# Patient Record
Sex: FEMALE | Race: WHITE | NOT HISPANIC OR LATINO | Employment: FULL TIME | URBAN - NONMETROPOLITAN AREA
[De-identification: names, ages, dates, MRNs, and addresses within clinical notes are randomized per-mention and may not be internally consistent; named-entity substitution may affect disease eponyms.]

---

## 2023-03-07 ENCOUNTER — TELEPHONE (OUTPATIENT)
Dept: PRIMARY CARE | Facility: CLINIC | Age: 73
End: 2023-03-07
Payer: MEDICARE

## 2023-03-07 DIAGNOSIS — K12.0 ULCER APHTHOUS ORAL: Primary | ICD-10-CM

## 2023-03-07 RX ORDER — VALACYCLOVIR HYDROCHLORIDE 1 G/1
1000 TABLET, FILM COATED ORAL 3 TIMES DAILY
Qty: 15 TABLET | Refills: 0 | Status: SHIPPED | OUTPATIENT
Start: 2023-03-07 | End: 2023-03-12

## 2023-03-07 NOTE — TELEPHONE ENCOUNTER
Spoke to pt  -   She did see ENT -   No changes - note reviewed     Now  pain on the other side  -   When tilts head to opposite side - does not hurt     The last one on the other side took a while to get better     Will try Antiviral for now  - she is in agreement

## 2023-03-13 ENCOUNTER — TELEPHONE (OUTPATIENT)
Dept: PRIMARY CARE | Facility: CLINIC | Age: 73
End: 2023-03-13

## 2023-03-13 NOTE — TELEPHONE ENCOUNTER
"I think I sent you a message on my chart but couldn't find a \"send\" button so I don't know if I did or not.      "

## 2023-03-23 PROBLEM — M50.90 CERVICAL DISC DISEASE: Status: ACTIVE | Noted: 2023-03-23

## 2023-03-23 PROBLEM — J31.0 CHRONIC RHINITIS: Status: ACTIVE | Noted: 2023-03-23

## 2023-03-23 PROBLEM — N90.5 VULVAR ATROPHY: Status: ACTIVE | Noted: 2023-03-23

## 2023-03-23 PROBLEM — B37.31 CANDIDAL VULVOVAGINITIS: Status: ACTIVE | Noted: 2023-03-23

## 2023-03-23 PROBLEM — R07.81 RIB PAIN: Status: ACTIVE | Noted: 2023-03-23

## 2023-03-23 PROBLEM — R20.2 PARESTHESIAS: Status: ACTIVE | Noted: 2023-03-23

## 2023-03-23 PROBLEM — E21.0 PRIMARY HYPERPARATHYROIDISM (MULTI): Status: ACTIVE | Noted: 2023-03-23

## 2023-03-23 PROBLEM — J03.90 TONSILLITIS: Status: ACTIVE | Noted: 2023-03-23

## 2023-03-23 PROBLEM — E55.9 VITAMIN D DEFICIENCY: Status: ACTIVE | Noted: 2023-03-23

## 2023-03-23 PROBLEM — N76.0 BACTERIAL VAGINITIS: Status: ACTIVE | Noted: 2023-03-23

## 2023-03-23 PROBLEM — B96.89 BACTERIAL VAGINITIS: Status: ACTIVE | Noted: 2023-03-23

## 2023-03-23 PROBLEM — M51.36 LUMBAR DEGENERATIVE DISC DISEASE: Status: ACTIVE | Noted: 2023-03-23

## 2023-03-23 PROBLEM — M81.0 OSTEOPOROSIS: Status: ACTIVE | Noted: 2023-03-23

## 2023-03-23 PROBLEM — U07.1 COVID-19: Status: ACTIVE | Noted: 2023-03-23

## 2023-03-23 PROBLEM — R30.0 DYSURIA: Status: ACTIVE | Noted: 2023-03-23

## 2023-03-23 PROBLEM — Z87.09 HISTORY OF TONSILLITIS: Status: ACTIVE | Noted: 2023-03-23

## 2023-03-23 PROBLEM — M54.2 NECK PAIN: Status: ACTIVE | Noted: 2023-03-23

## 2023-03-23 PROBLEM — E83.52 HYPERCALCEMIA: Status: ACTIVE | Noted: 2023-03-23

## 2023-03-23 PROBLEM — M53.3 SACROILIAC JOINT PAIN: Status: ACTIVE | Noted: 2023-03-23

## 2023-03-23 PROBLEM — M16.11 PRIMARY OSTEOARTHRITIS OF RIGHT HIP: Status: ACTIVE | Noted: 2023-03-23

## 2023-03-23 PROBLEM — M51.369 LUMBAR DEGENERATIVE DISC DISEASE: Status: ACTIVE | Noted: 2023-03-23

## 2023-03-23 PROBLEM — M79.605 PAIN OF LEFT LEG: Status: ACTIVE | Noted: 2023-03-23

## 2023-03-23 PROBLEM — Z85.3 HISTORY OF ADENOCARCINOMA OF BREAST: Status: ACTIVE | Noted: 2023-03-23

## 2023-03-23 PROBLEM — J02.9 PHARYNGITIS: Status: ACTIVE | Noted: 2023-03-23

## 2023-03-23 PROBLEM — K14.0 GLOSSITIS: Status: ACTIVE | Noted: 2023-03-23

## 2023-03-23 PROBLEM — E03.9 PRIMARY HYPOTHYROIDISM: Status: ACTIVE | Noted: 2023-03-23

## 2023-03-23 PROBLEM — R19.00 PELVIC MASS IN FEMALE: Status: ACTIVE | Noted: 2023-03-23

## 2023-03-23 PROBLEM — M89.9 DISORDER OF BONE: Status: ACTIVE | Noted: 2023-03-23

## 2023-03-23 RX ORDER — LANOLIN ALCOHOL/MO/W.PET/CERES
1 CREAM (GRAM) TOPICAL DAILY
COMMUNITY
Start: 2022-09-14

## 2023-03-23 RX ORDER — ACETAMINOPHEN 500 MG
1 TABLET ORAL DAILY
COMMUNITY

## 2023-03-23 RX ORDER — LEVOTHYROXINE SODIUM 75 UG/1
1 TABLET ORAL DAILY
COMMUNITY
Start: 2013-07-10 | End: 2023-05-15

## 2023-03-28 ENCOUNTER — OFFICE VISIT (OUTPATIENT)
Dept: PRIMARY CARE | Facility: CLINIC | Age: 73
End: 2023-03-28
Payer: MEDICARE

## 2023-03-28 VITALS
BODY MASS INDEX: 26.96 KG/M2 | WEIGHT: 169.6 LBS | HEART RATE: 67 BPM | DIASTOLIC BLOOD PRESSURE: 82 MMHG | SYSTOLIC BLOOD PRESSURE: 144 MMHG

## 2023-03-28 DIAGNOSIS — S39.012A STRAIN OF LUMBAR REGION, INITIAL ENCOUNTER: ICD-10-CM

## 2023-03-28 DIAGNOSIS — K21.9 GASTROESOPHAGEAL REFLUX DISEASE WITHOUT ESOPHAGITIS: ICD-10-CM

## 2023-03-28 DIAGNOSIS — K21.9 GASTROESOPHAGEAL REFLUX DISEASE WITHOUT ESOPHAGITIS: Primary | ICD-10-CM

## 2023-03-28 PROCEDURE — 1160F RVW MEDS BY RX/DR IN RCRD: CPT | Performed by: FAMILY MEDICINE

## 2023-03-28 PROCEDURE — 1036F TOBACCO NON-USER: CPT | Performed by: FAMILY MEDICINE

## 2023-03-28 PROCEDURE — 1159F MED LIST DOCD IN RCRD: CPT | Performed by: FAMILY MEDICINE

## 2023-03-28 PROCEDURE — 99213 OFFICE O/P EST LOW 20 MIN: CPT | Performed by: FAMILY MEDICINE

## 2023-03-28 RX ORDER — OMEPRAZOLE 20 MG/1
CAPSULE, DELAYED RELEASE ORAL
Qty: 30 CAPSULE | Refills: 2 | Status: SHIPPED | OUTPATIENT
Start: 2023-03-28 | End: 2023-03-28 | Stop reason: SDUPTHER

## 2023-03-28 RX ORDER — TIZANIDINE 2 MG/1
2 TABLET ORAL EVERY 8 HOURS PRN
Qty: 60 TABLET | Refills: 2 | Status: SHIPPED | OUTPATIENT
Start: 2023-03-28 | End: 2023-04-27

## 2023-03-28 RX ORDER — OMEPRAZOLE 20 MG/1
TABLET, DELAYED RELEASE ORAL
Qty: 30 TABLET | Refills: 2 | Status: SHIPPED | OUTPATIENT
Start: 2023-03-28 | End: 2023-03-28

## 2023-03-28 RX ORDER — OMEPRAZOLE 20 MG/1
20 CAPSULE, DELAYED RELEASE ORAL DAILY
Qty: 30 CAPSULE | Refills: 2 | Status: SHIPPED | OUTPATIENT
Start: 2023-03-28 | End: 2023-04-19

## 2023-03-28 ASSESSMENT — ENCOUNTER SYMPTOMS
OCCASIONAL FEELINGS OF UNSTEADINESS: 0
DEPRESSION: 0
LOSS OF SENSATION IN FEET: 0

## 2023-03-28 NOTE — PATIENT INSTRUCTIONS
I think what is happening is the night time Ibuprofen is causing some reflux -   Silent aspiration.    Stop the Ibuprofen - can take Tylenol as needed -  a back massage would help   Ibuprofen can raise blood pressure.      Lets have you take Omeprazole 20 mg -  30 min before dinner -   Try not to eat  or drink 3 hours before bed.      You should be able to stop this medication in 1 - 2 months if it helps you.     Can try tizanidine as needed for the muscle pain  -  may causes sedation .     Let me know if this helps

## 2023-03-28 NOTE — PROGRESS NOTES
Subjective   Patient ID: Susana Roa is a 72 y.o. female who presents for Sore Throat (2 months).    HPI     Throat issues for a while now  -   Did see ENT  - did not have specific concerns     Currently soreness back tongue and front of tongue -   Burning of tongue -     Throat feels scratchy     [facility] -  [facility]     Also pain left axilla - started a few weeks ago  -   But slowly improving - now only pain with pushing     Having low back pain often - taking Ibuprofen each PM     Works bent over a lot     Review of Systems    Objective   /82   Pulse 67   Wt 76.9 kg (169 lb 9.6 oz)   BMI 26.96 kg/m²     Physical Exam  Constitutional:       Appearance: Normal appearance.   HENT:      Head: Normocephalic and atraumatic.      Right Ear: Tympanic membrane and ear canal normal.      Left Ear: Tympanic membrane and ear canal normal.      Nose: No congestion or rhinorrhea.      Mouth/Throat:      Mouth: Mucous membranes are dry.      Pharynx: Oropharynx is clear.   Cardiovascular:      Rate and Rhythm: Normal rate.   Musculoskeletal:      Cervical back: Normal range of motion and neck supple. No tenderness.      Comments: Muscle spasm of low back  - priti on the right    Neurological:      Mental Status: She is alert.         Assessment/Plan   Problem List Items Addressed This Visit          Digestive    Gastroesophageal reflux disease without esophagitis - Primary     Discussed with her that the throat and tongues issues are likely from silent GERD -   And the ibuprofen hs is likey causing it  - she will stop that - we will try omeprazole a couple of months          Relevant Medications    omeprazole (PriLOSEC) 20 mg DR capsule       Musculoskeletal    Strain of lumbar region    Relevant Medications    tiZANidine (Zanaflex) 2 mg tablet

## 2023-03-29 PROBLEM — Z87.09 HISTORY OF TONSILLITIS: Status: RESOLVED | Noted: 2023-03-23 | Resolved: 2023-03-29

## 2023-03-29 PROBLEM — N76.0 BACTERIAL VAGINITIS: Status: RESOLVED | Noted: 2023-03-23 | Resolved: 2023-03-29

## 2023-03-29 PROBLEM — S39.012A STRAIN OF LUMBAR REGION: Status: ACTIVE | Noted: 2023-03-29

## 2023-03-29 PROBLEM — K21.9 GASTROESOPHAGEAL REFLUX DISEASE WITHOUT ESOPHAGITIS: Status: ACTIVE | Noted: 2023-03-29

## 2023-03-29 PROBLEM — B96.89 BACTERIAL VAGINITIS: Status: RESOLVED | Noted: 2023-03-23 | Resolved: 2023-03-29

## 2023-03-29 PROBLEM — B37.31 CANDIDAL VULVOVAGINITIS: Status: RESOLVED | Noted: 2023-03-23 | Resolved: 2023-03-29

## 2023-03-29 NOTE — ASSESSMENT & PLAN NOTE
Discussed with her that the throat and tongues issues are likely from silent GERD -   And the ibuprofen hs is likey causing it  - she will stop that - we will try omeprazole a couple of months

## 2023-04-19 DIAGNOSIS — K21.9 GASTROESOPHAGEAL REFLUX DISEASE WITHOUT ESOPHAGITIS: ICD-10-CM

## 2023-04-19 RX ORDER — OMEPRAZOLE 20 MG/1
CAPSULE, DELAYED RELEASE ORAL
Qty: 30 CAPSULE | Refills: 2 | Status: SHIPPED | OUTPATIENT
Start: 2023-04-19 | End: 2023-07-03

## 2023-05-12 ENCOUNTER — TELEPHONE (OUTPATIENT)
Dept: PRIMARY CARE | Facility: CLINIC | Age: 73
End: 2023-05-12
Payer: MEDICARE

## 2023-05-12 NOTE — TELEPHONE ENCOUNTER
I called pt  -   We talked - still an terrible irritation in the back of her throat and her tongue - has been on PPI 2 mos now - not much better    Told her I would like her to call the ENT again and tell them she needs a scope  - she will do that and let me know if she runs into an issue

## 2023-05-12 NOTE — TELEPHONE ENCOUNTER
I guess MY CHART is not sending you messages so I figured that I would make a quick call.  Things are the same.  I added a pepcid in the morning and it has not made any difference.  My throat still hurts and my tongue is just weird.  I feel okay otherwise, no stomach pain or anything.  I am willing to go get a scope done IF we are at that point yet.  Let me know.  Have a nice Mother's day.

## 2023-05-14 DIAGNOSIS — E03.9 HYPOTHYROIDISM, UNSPECIFIED: ICD-10-CM

## 2023-05-15 ENCOUNTER — PATIENT MESSAGE (OUTPATIENT)
Dept: PRIMARY CARE | Facility: CLINIC | Age: 73
End: 2023-05-15
Payer: MEDICARE

## 2023-05-15 DIAGNOSIS — Z12.11 COLON CANCER SCREENING: Primary | ICD-10-CM

## 2023-05-15 RX ORDER — LEVOTHYROXINE SODIUM 75 UG/1
TABLET ORAL
Qty: 90 TABLET | Refills: 0 | Status: SHIPPED | OUTPATIENT
Start: 2023-05-15 | End: 2023-08-14

## 2023-06-03 DIAGNOSIS — J02.9 PHARYNGITIS, UNSPECIFIED ETIOLOGY: Primary | ICD-10-CM

## 2023-07-03 ENCOUNTER — OFFICE VISIT (OUTPATIENT)
Dept: PRIMARY CARE | Facility: CLINIC | Age: 73
End: 2023-07-03
Payer: MEDICARE

## 2023-07-03 VITALS
DIASTOLIC BLOOD PRESSURE: 64 MMHG | SYSTOLIC BLOOD PRESSURE: 138 MMHG | OXYGEN SATURATION: 95 % | BODY MASS INDEX: 26.2 KG/M2 | HEART RATE: 86 BPM | HEIGHT: 66 IN | WEIGHT: 163 LBS

## 2023-07-03 DIAGNOSIS — M81.8 OTHER OSTEOPOROSIS, UNSPECIFIED PATHOLOGICAL FRACTURE PRESENCE: ICD-10-CM

## 2023-07-03 DIAGNOSIS — Z00.00 ROUTINE GENERAL MEDICAL EXAMINATION AT HEALTH CARE FACILITY: Primary | ICD-10-CM

## 2023-07-03 DIAGNOSIS — E21.0 PRIMARY HYPERPARATHYROIDISM (MULTI): ICD-10-CM

## 2023-07-03 DIAGNOSIS — K21.9 GASTROESOPHAGEAL REFLUX DISEASE WITHOUT ESOPHAGITIS: ICD-10-CM

## 2023-07-03 DIAGNOSIS — Z23 IMMUNIZATION DUE: ICD-10-CM

## 2023-07-03 DIAGNOSIS — E03.9 PRIMARY HYPOTHYROIDISM: ICD-10-CM

## 2023-07-03 DIAGNOSIS — M81.0 OSTEOPOROSIS, UNSPECIFIED OSTEOPOROSIS TYPE, UNSPECIFIED PATHOLOGICAL FRACTURE PRESENCE: ICD-10-CM

## 2023-07-03 DIAGNOSIS — Z12.31 SCREENING MAMMOGRAM, ENCOUNTER FOR: ICD-10-CM

## 2023-07-03 PROBLEM — U07.1 COVID-19: Status: RESOLVED | Noted: 2023-03-23 | Resolved: 2023-07-03

## 2023-07-03 PROBLEM — J03.90 TONSILLITIS: Status: RESOLVED | Noted: 2023-03-23 | Resolved: 2023-07-03

## 2023-07-03 LAB
ALANINE AMINOTRANSFERASE (SGPT) (U/L) IN SER/PLAS: 12 U/L (ref 7–45)
ALBUMIN (G/DL) IN SER/PLAS: 4.1 G/DL (ref 3.4–5)
ALKALINE PHOSPHATASE (U/L) IN SER/PLAS: 105 U/L (ref 33–136)
ANION GAP IN SER/PLAS: 12 MMOL/L (ref 10–20)
ASPARTATE AMINOTRANSFERASE (SGOT) (U/L) IN SER/PLAS: 17 U/L (ref 9–39)
BILIRUBIN TOTAL (MG/DL) IN SER/PLAS: 0.5 MG/DL (ref 0–1.2)
CALCIUM (MG/DL) IN SER/PLAS: 10.2 MG/DL (ref 8.6–10.3)
CARBON DIOXIDE, TOTAL (MMOL/L) IN SER/PLAS: 28 MMOL/L (ref 21–32)
CHLORIDE (MMOL/L) IN SER/PLAS: 103 MMOL/L (ref 98–107)
CREATININE (MG/DL) IN SER/PLAS: 0.88 MG/DL (ref 0.5–1.05)
GFR FEMALE: 69 ML/MIN/1.73M2
GLUCOSE (MG/DL) IN SER/PLAS: 84 MG/DL (ref 74–99)
POTASSIUM (MMOL/L) IN SER/PLAS: 5.4 MMOL/L (ref 3.5–5.3)
PROTEIN TOTAL: 7.2 G/DL (ref 6.4–8.2)
SODIUM (MMOL/L) IN SER/PLAS: 138 MMOL/L (ref 136–145)
THYROTROPIN (MIU/L) IN SER/PLAS BY DETECTION LIMIT <= 0.05 MIU/L: 1.38 MIU/L (ref 0.44–3.98)
UREA NITROGEN (MG/DL) IN SER/PLAS: 15 MG/DL (ref 6–23)

## 2023-07-03 PROCEDURE — G0439 PPPS, SUBSEQ VISIT: HCPCS | Performed by: FAMILY MEDICINE

## 2023-07-03 PROCEDURE — 1160F RVW MEDS BY RX/DR IN RCRD: CPT | Performed by: FAMILY MEDICINE

## 2023-07-03 PROCEDURE — 1036F TOBACCO NON-USER: CPT | Performed by: FAMILY MEDICINE

## 2023-07-03 PROCEDURE — 84443 ASSAY THYROID STIM HORMONE: CPT

## 2023-07-03 PROCEDURE — 80053 COMPREHEN METABOLIC PANEL: CPT

## 2023-07-03 PROCEDURE — 82306 VITAMIN D 25 HYDROXY: CPT

## 2023-07-03 PROCEDURE — 1159F MED LIST DOCD IN RCRD: CPT | Performed by: FAMILY MEDICINE

## 2023-07-03 PROCEDURE — 99214 OFFICE O/P EST MOD 30 MIN: CPT | Performed by: FAMILY MEDICINE

## 2023-07-03 PROCEDURE — 1170F FXNL STATUS ASSESSED: CPT | Performed by: FAMILY MEDICINE

## 2023-07-03 RX ORDER — FAMOTIDINE 20 MG/1
20 TABLET, FILM COATED ORAL NIGHTLY
COMMUNITY
Start: 2023-06-01 | End: 2023-07-03 | Stop reason: SDUPTHER

## 2023-07-03 RX ORDER — TRIAMCINOLONE ACETONIDE 1 MG/G
PASTE DENTAL
COMMUNITY
Start: 2023-06-01 | End: 2023-07-03 | Stop reason: SINTOL

## 2023-07-03 RX ORDER — FAMOTIDINE 20 MG/1
20 TABLET, FILM COATED ORAL NIGHTLY
Qty: 90 TABLET | Refills: 1 | Status: SHIPPED | OUTPATIENT
Start: 2023-07-03

## 2023-07-03 ASSESSMENT — ACTIVITIES OF DAILY LIVING (ADL)
TAKING_MEDICATION: INDEPENDENT
GROCERY_SHOPPING: INDEPENDENT
DOING_HOUSEWORK: INDEPENDENT
DRESSING: INDEPENDENT
BATHING: INDEPENDENT
MANAGING_FINANCES: INDEPENDENT

## 2023-07-03 ASSESSMENT — PATIENT HEALTH QUESTIONNAIRE - PHQ9
2. FEELING DOWN, DEPRESSED OR HOPELESS: NOT AT ALL
1. LITTLE INTEREST OR PLEASURE IN DOING THINGS: NOT AT ALL
SUM OF ALL RESPONSES TO PHQ9 QUESTIONS 1 AND 2: 0

## 2023-07-03 NOTE — PATIENT INSTRUCTIONS
"Prevnar 20 is vaccine for you to get at the pharmacy       I will be emailing the GI docs to see if we can get you in sooner     Stay on the Pepcid       Mammogram is due in November -       Bone Density is due in October  -         ABOUT MEDICARE PREVENTATIVE APPOINTMENTS:     YOUR YEARLY MEDICARE WELLNESS VISIT IS VERY IMPORTANT.      Medicare wants all of their patients to schedule their \"Welcome to Medicare\" visit  when you are in the first 12 months of being on Medicare.    Then every year after that, they want you to schedule your  \"Annual Wellness\"  visit.     These visits have a very specific list of topics I have to cover at the visit,  so you will have paperwork you need to complete before I see you.   Of interest,  there is NOT actually a physical exam as part of this visit.       If you are female,   a Well Woman Exam  (Breast exam and pelvic exam) - are paid for by Medicare every 2 years.   Mammograms are paid for every year.    If you are due for this exam too,  the Medicare wellness and the well woman exam can be done on the same day,  PLEASE TELL THIS TO  WHEN MAKING THE APPOINTMENT.      Some of the Medicare plans ALSO cover a traditional \"physical\" - which has more of the physical exam component.   You may want to find out if your insurance covers that too.       (this is  the code - 39543)  - That can be added to the visit as well.    You would need to tell us.     IT'S VERY HELPFUL IF YOU KNOW WHAT YOUR PLAN COVERS AHEAD OF THE VISIT.      Please check to see what your plan(s) cover.   (Even checking on testing and vaccines that are covered or not helps us greatly!)     At these appointments ,  IF  we cover any of your chronic medical conditions,  medical concerns,  or medications,  I will also be billing giorgio  \"E/M  code\" which stands for \"Evaluation and Management\"    -  which is a regular office visit code.    THAT CHARGE MAY BE SUBJECT TO CO-PAYS AND DEDUCTIBLES.     PLEASE DO NOT " "\"SAVE\" ALL OF YOUR CONCERNS TO GO OVER AT THESE YEARLY WELLNESS VISITS.   YOU SHOULD BE SCHEDULING SEPARATE APPOINTMENTS WHEN YOU HAVE HEALTH CONCERNS TO DISCUSS AND FOR YOUR MEDICATION CHECK UP APPOINTMENTS.        Thank you.            WELL VISIT/PREVENTATIVE VISIT INSTRUCTIONS:        Call [phone] to schedule any testing ordered at [facility].      For a mammogram, call   [phone] .    Please work on healthy nutrition,  optimal sleep, and regular exercise to feel better.    If you smoke - please quit, and if you drink alcohol, please limit your intake.     Generally speaking,  good nutrition consists of lean meats, like chicken, fish and turkey (not fried);    plenty of fruits and vegetables (the fresher the better);   Less sugars, saturated fats, and processed foods - especially those made with white flour.   Try to eat the majority of your grain foods  as whole grains.   Keep an eye on your total calories in a day and serving sizes on packaging - this will help you limit your overall intake.    Remember, to lose weight (if you need to), your total calorie intake has to be about 300 - 500 calories less than what you burn in a day.   That number depends on your age, gender and body size.   Some people find a fitbit (calorie tracking device) helpful.      Please be sure to see the dentist every 6 months.    Please see the eye doctor no longer than every 2 years.    When you have your Living Will and Medical Power of  papers completed   (they have to be witnessed by 2 non-relatives or notarized to be legally binding),     please keep your originals in a safe place in your home, but make sure all your physicians have copies and that you take copies with you when you go to the hospital.        GETTING TEST RESULTS:    YOU WILL ALWAYS FIND OUT ABOUT ALL YOUR TEST RESULTS FROM ME.  I do not use the \"no news is good news\" policy.   The only time you would not, is if I have told you to get the " "testing done, and make a follow up appointment to go over it.    Then I will be waiting to talk to you at the visit about your test results.     I have a few different ways I get test results to you  (if its something urgent, we call you)  :       If you have a Secure[employer] card -  I will have your test results on your secureach card within a week.  You should get a phone call telling you when the results are on the card, or just call the card in a week.   If two weeks go  by and you have not heard anything, call the card to see if the results are there,  and if not,  leave me a message.  If you are having trouble using [employer], they have a support line you should call :   [phone];   If you have lost your card, I need to know.     IT'S VERY IMPORTANT THAT YOU CALL TO LISTEN TO YOUR RESULTS, AS IT THEN LETS ME KNOW YOU GOT YOUR RESULTS.        If you get your test results on MY CHART,  you may commonly see your results even before I do.      I will always annotate a message on your results so you know that I saw them and how I feel about them.         IF I mail your results to you,   I need to hear from you if you do not receive them within 2 weeks.      Be sure to let me know your preference for getting results.         BILLING FOR PREVENTATIVE VISITS.     Most insurance companies pay for a yearly \"Wellness Check\"  or they may call it a \"Preventative Physical\"   - but it's important to know what your plan covers ahead of the visit.    It's also a good idea to find out what sort of preventative testing they will cover for screening tests - like cholesterol, blood sugar, or colonoscopies. Also, find out which vaccines are covered and if you have any responsibility in paying for them.       If at your Wellness Visit,  we cover anything to do with problems/issues  you are having or  chronic medications/ medical conditions you have,   there will ALSO be a regular office charge that day.     Blood work  or " testing obtained that has anything to do with an acute issue or chronic condition is billed under that diagnosis and not screening.       It's a good idea to find out from your insurance what is covered and what is your responsibility for all of the above possible charges.       Most importantly,   if you ever have any questions or concerns that cannot wait until your next visit with me,  you can message me through MY CHART or call the office and leave a voice mail message  (please allow for at least 24 hours for responses for these messages).       Take good care.   Dr Kent

## 2023-07-03 NOTE — PROGRESS NOTES
Subjective   Reason for Visit: Susana Roa is an 72 y.o. female here for a Medicare Wellness visit.       HPI    LOV  several appts for sore throat    Last Medicare wellness with [employer]  2022      Today is General Medicare Wellness       Updates and Concerns:     For the sore throat -   Saw ENT  - given her a steroid paste for her tongue  - did not try it , did get upper scope - did not see anything     Did make appt with GI - for 23          Due for colonoscopy too          Throat and mouth were feeling somewhat better with the pepcid -   but then it started to bother her again even on it - so she stopped it  - sx did not change     Tried omeprazole before that -  did not help     Current sx - the back of her throat - tongue feels swollen   And ears feel funny now     Drinks one cup of coffee in the AM     Keeping herself elevated                  WAC -   Last [facility]   2022     Last pap 6/3/13 WNL - HPV Neg (She is done with pap smears)     LMP - stopped in her 50's - no bleeding since      - 2  Para - 2     Last mammogram - hx of BRCA ; WAS on anastrozole for 5 years, discharged from oncology 2017   was getting yearly MRIs of the breast. 2018 needed mamm too - WNL -   Could just get yearly mammograms per radiology   Last mamm  - WNL  -  2022      Breast issues? - NONE currently      Bone density  DEXA - 10/2019 - Osteoporosis - 10/17/2019 - started Ibandronate   (Hx of anastrozole 5 years)   Saw Dr Cisse from endocrine 2021 for osteoporosis; primary hyperparathyroidism; hypothyroid and Vit D def   ordered DEXA scan , continued her on ibandronate        DEXA done 10 /2021 - improved BMD      fracture history - none     Last colonoscopy - last one  - was good for 10 years.     vaccines -   Flu shot - UTD  Pneumovax - , Prevnar 13 - 2017 ;  WILL GET PREVNAR 20 TODAY   Adacel 2015  Shingrix :   X 2  DONE   COVID : did get Pfizer  and 21 and Boosters   And then had covid too     "  vision  -  about 1.5 years ago      dentist - GOES REGULARLY     Need coronary calcium score? - CARDIAC CT - JULY 2017 ZERO       passes away 2019     Living will and medical POA on chart ;   received and in chart       Chronic conditions -      Hypothyroidism -   Levothryroxine 75 mcg a day     Hyperparathrydoidism -  on Vit D ,  calcium runs mildly high     Cervical and Lumbar  DDD -   loves tizanadine  - takes occas - works great           Patient Care Team:  Gabriella Lacy MD as PCP - General  Gabri[facility] Lacy [facility] P[facility]     Review of Systems    Objective   Vitals:  /64 (BP Location: Right leg, Patient Position: Sitting, BP Cuff Size: Large adult)   Pulse 86   Ht 1.676 m (5' 6\")   Wt 73.9 kg (163 lb)   SpO2 95%   BMI 26.31 kg/m²       Physical Exam  Vitals reviewed.   Constitutional:       General: She is not in acute distress.     Appearance: Normal appearance.   HENT:      Head: Normocephalic and atraumatic.      Nose: Nose normal.      Mouth/Throat:      Pharynx: No posterior oropharyngeal erythema.   Eyes:      Extraocular Movements: Extraocular movements intact.      Conjunctiva/sclera: Conjunctivae normal.      Pupils: Pupils are equal, round, and reactive to light.   Cardiovascular:      Rate and Rhythm: Normal rate and regular rhythm.      Heart sounds: Normal heart sounds. No murmur heard.  Pulmonary:      Effort: Pulmonary effort is normal. No respiratory distress.      Breath sounds: Normal breath sounds. No wheezing.   Musculoskeletal:      Cervical back: Neck supple.   Lymphadenopathy:      Cervical: No cervical adenopathy.   Skin:     General: Skin is warm and dry.      Findings: No rash.   Neurological:      General: No focal deficit present.      Mental Status: She is alert.   Psychiatric:         Mood and Affect: Mood normal.         Thought Content: Thought content normal.         Assessment/Plan   Problem List Items " Addressed This Visit          Medium    Osteoporosis    Relevant Orders    Comprehensive Metabolic Panel    Vitamin D, Total    XR DEXA bone density    Primary hypothyroidism    Relevant Orders    Thyroid Stimulating Hormone    Gastroesophageal reflux disease without esophagitis    Relevant Medications    famotidine (Pepcid) 20 mg tablet     Other Visit Diagnoses       Routine general medical examination at health care facility    -  Primary    Immunization due        Relevant Orders    Pneumococcal conjugate vaccine, 20-valent, adult (PREVNAR 20)    Screening mammogram, encounter for        Relevant Orders    BI mammo bilateral screening tomosynthesis          Medicare Wellness today  -     And follow up on meds and issues -   Biggest issue right now is the odd feeling on her tongue and throat -   Which pepcid helped somewhat at first   Did see ENT -   Now needs to see GI -   Will try to get her in sooner     Checking on TSH and calcium today -     We discussed at visit any disease processes that were of concern as well as the risks, benefits and instructions of any new medication provided.    See orders and discussion section for information handed to patient on their Clinical Summary.   Patient (and/or caretaker of patient if present)  stated all questions were answered, and they voiced understanding of instructions.

## 2023-07-04 LAB — CALCIDIOL (25 OH VITAMIN D3) (NG/ML) IN SER/PLAS: 57 NG/ML

## 2023-08-13 DIAGNOSIS — E03.9 HYPOTHYROIDISM, UNSPECIFIED: ICD-10-CM

## 2023-08-14 RX ORDER — LEVOTHYROXINE SODIUM 75 UG/1
TABLET ORAL
Qty: 90 TABLET | Refills: 3 | Status: SHIPPED | OUTPATIENT
Start: 2023-08-14

## 2023-10-27 ENCOUNTER — ANCILLARY PROCEDURE (OUTPATIENT)
Dept: RADIOLOGY | Facility: CLINIC | Age: 73
End: 2023-10-27
Payer: MEDICARE

## 2023-10-27 ENCOUNTER — OFFICE VISIT (OUTPATIENT)
Dept: PRIMARY CARE | Facility: CLINIC | Age: 73
End: 2023-10-27
Payer: MEDICARE

## 2023-10-27 VITALS
DIASTOLIC BLOOD PRESSURE: 80 MMHG | SYSTOLIC BLOOD PRESSURE: 122 MMHG | HEART RATE: 70 BPM | OXYGEN SATURATION: 98 % | WEIGHT: 168.2 LBS | BODY MASS INDEX: 27.15 KG/M2

## 2023-10-27 DIAGNOSIS — M79.672 LEFT FOOT PAIN: Primary | ICD-10-CM

## 2023-10-27 DIAGNOSIS — M79.672 LEFT FOOT PAIN: ICD-10-CM

## 2023-10-27 PROCEDURE — 1160F RVW MEDS BY RX/DR IN RCRD: CPT

## 2023-10-27 PROCEDURE — 73630 X-RAY EXAM OF FOOT: CPT | Mod: LT

## 2023-10-27 PROCEDURE — 1159F MED LIST DOCD IN RCRD: CPT

## 2023-10-27 PROCEDURE — 1036F TOBACCO NON-USER: CPT

## 2023-10-27 PROCEDURE — 73630 X-RAY EXAM OF FOOT: CPT | Mod: LEFT SIDE | Performed by: RADIOLOGY

## 2023-10-27 PROCEDURE — 99213 OFFICE O/P EST LOW 20 MIN: CPT

## 2023-10-27 NOTE — PROGRESS NOTES
Subjective   Patient ID: Susana Roa is a 73 y.o. female who presents for Pain (L pain).  TANVIR Meza presents for pain in her L foot that started in the middle of the night last night. She states she went to get up and go to the bathroom when she had this horrible sharp pain in the bottom of her L foot.  The pain is still there now. It is constant, it is worse if she stands on it/puts weight on it.   She did not fall or bump it on anything that she can think of. No recent injury.   She also feels numbness in her toes.   She has not taken tylenol or advil for the pain.    Past Surgical History:   Procedure Laterality Date    BREAST LUMPECTOMY  05/30/2013    Right Breast Lumpectomy    COLONOSCOPY  05/30/2013    Complete Colonoscopy    DILATION AND CURETTAGE OF UTERUS  06/13/2013    Dilation And Curettage    HAND TENDON SURGERY  10/13/2013    Hand Incision Tendon Sheath Of A Finger      Past Medical History:   Diagnosis Date    Dysuria 06/21/2016    Dysuria    Malignant neoplasm of unspecified site of unspecified female breast (CMS/[facility]) 08/27/2019    Adenocarcinoma of breast    Malignant neoplasm of unspecified site of unspecified female breast (CMS/[facility]) 08/27/2019    Adenocarcinoma of breast    Noninfective gastroenteritis and colitis, unspecified 07/02/2013    Acute colitis    Personal history of malignant neoplasm of breast 06/22/2018    History of malignant neoplasm of breast    Personal history of other diseases of the musculoskeletal system and connective tissue 04/21/2016    History of osteoporosis    Personal history of other endocrine, nutritional and metabolic disease 08/19/2013    History of hypothyroidism    Personal history of other specified conditions 07/14/2020    History of paresthesia    Trigger finger, unspecified finger 10/24/2013    Trigger finger, acquired    Urinary tract infection, site not specified 06/16/2016    Acute UTI     Social History     Tobacco Use    Smoking status: Never      Passive exposure: Never    Smokeless tobacco: Never   Vaping Use    Vaping Use: Never used   Substance Use Topics    Alcohol use: Never    Drug use: Never        Review of Systems  10 point review of systems performed and is negative except as noted in the HPI.      Current Outpatient Medications:     cholecalciferol (Vitamin D-3) 5,000 Units tablet, Take 1 tablet (5,000 Units) by mouth once daily., Disp: , Rfl:     cyanocobalamin (Vitamin B-12) 1,000 mcg tablet, Take 1 tablet (1,000 mcg) by mouth once daily., Disp: , Rfl:     famotidine (Pepcid) 20 mg tablet, Take 1 tablet (20 mg) by mouth once daily at bedtime., Disp: 90 tablet, Rfl: 1    levothyroxine (Synthroid, Levoxyl) 75 mcg tablet, TAKE 1 TABLET BY MOUTH EVERY DAY, Disp: 90 tablet, Rfl: 3    tiZANidine (Zanaflex) 2 mg tablet, Take 1 tablet (2 mg) by mouth every 8 hours if needed for muscle spasms., Disp: 60 tablet, Rfl: 2     Objective   /80   Pulse 70   Wt 76.3 kg (168 lb 3.2 oz)   SpO2 98%   BMI 27.15 kg/m²     Physical Exam  Constitutional:       General: She is not in acute distress.     Appearance: Normal appearance. She is not ill-appearing.   Musculoskeletal:      Right lower leg: No edema.      Left lower leg: No edema.        Feet:    Feet:      Left foot:      Skin integrity: Skin integrity normal. No blister, skin breakdown, erythema or warmth.      Toenail Condition: Left toenails are normal.      Comments: Very tender to palpation in area of red [place]. Not tender over heel. No swelling seen. No wound. No visible injury. ROM intact.   Skin:     General: Skin is warm and dry.      Findings: No bruising.   Neurological:      Mental Status: She is alert and oriented to person, place, and time.         Assessment/Plan   Problem List Items Addressed This Visit    None  Visit Diagnoses       Left foot pain    -  Primary    Relevant Orders    XR foot left 3+ views (Completed)        L Foot pain   Wear hard soled/supportive tennis shoes   Ice  on the foot as well   Xray ordered to r/o possibility of stress fracture due to osteopenia (she is having updated DEXA scan in a couple weeks) - xray results: suggestion of osteopenia; plantar calcaneal spur which can be associated with plantar fasciitis.   Pain and presentation do  not fit typical plantar fasciitis presentation - discussed follow up with podiatrist if she continues to have pain      Discussed at visit any disease processes that were of concern as well as the risks, benefits and instructions on any new medication provided. Patient (and/or caretaker of patient if present) stated all questions were answered, and they voiced understanding of instructions.

## 2023-11-10 ENCOUNTER — OFFICE VISIT (OUTPATIENT)
Dept: GASTROENTEROLOGY | Facility: EXTERNAL LOCATION | Age: 73
End: 2023-11-10
Payer: MEDICARE

## 2023-11-10 DIAGNOSIS — D12.3 BENIGN NEOPLASM OF TRANSVERSE COLON: ICD-10-CM

## 2023-11-10 DIAGNOSIS — J02.9 ACUTE PHARYNGITIS, UNSPECIFIED ETIOLOGY: ICD-10-CM

## 2023-11-10 DIAGNOSIS — Z12.11 COLON CANCER SCREENING: ICD-10-CM

## 2023-11-10 DIAGNOSIS — D12.2 BENIGN NEOPLASM OF ASCENDING COLON: ICD-10-CM

## 2023-11-10 DIAGNOSIS — K21.9 GASTRO-ESOPHAGEAL REFLUX DISEASE WITHOUT ESOPHAGITIS: ICD-10-CM

## 2023-11-10 DIAGNOSIS — K64.8 INTERNAL HEMORRHOIDS: ICD-10-CM

## 2023-11-10 DIAGNOSIS — K57.30 DIVERTICULOSIS OF LARGE INTESTINE WITHOUT DIVERTICULITIS: Primary | ICD-10-CM

## 2023-11-10 DIAGNOSIS — D12.0 BENIGN NEOPLASM OF CECUM: ICD-10-CM

## 2023-11-10 PROCEDURE — 43239 EGD BIOPSY SINGLE/MULTIPLE: CPT | Performed by: INTERNAL MEDICINE

## 2023-11-10 PROCEDURE — 45385 COLONOSCOPY W/LESION REMOVAL: CPT | Performed by: INTERNAL MEDICINE

## 2023-11-10 PROCEDURE — 1036F TOBACCO NON-USER: CPT | Performed by: INTERNAL MEDICINE

## 2023-11-10 PROCEDURE — 88305 TISSUE EXAM BY PATHOLOGIST: CPT

## 2023-11-10 PROCEDURE — 1159F MED LIST DOCD IN RCRD: CPT | Performed by: INTERNAL MEDICINE

## 2023-11-10 PROCEDURE — 1160F RVW MEDS BY RX/DR IN RCRD: CPT | Performed by: INTERNAL MEDICINE

## 2023-11-10 PROCEDURE — 88305 TISSUE EXAM BY PATHOLOGIST: CPT | Performed by: PATHOLOGY

## 2023-11-13 DIAGNOSIS — K21.9 GASTROESOPHAGEAL REFLUX DISEASE WITHOUT ESOPHAGITIS: ICD-10-CM

## 2023-11-13 RX ORDER — ESOMEPRAZOLE MAGNESIUM 40 MG/1
40 CAPSULE, DELAYED RELEASE ORAL
Qty: 90 CAPSULE | Refills: 2 | Status: SHIPPED | OUTPATIENT
Start: 2023-11-13

## 2023-11-13 RX ORDER — ESOMEPRAZOLE MAGNESIUM 40 MG/1
40 CAPSULE, DELAYED RELEASE ORAL
Qty: 90 CAPSULE | Refills: 2 | Status: SHIPPED | OUTPATIENT
Start: 2023-11-13 | End: 2023-11-13

## 2023-11-14 ENCOUNTER — LAB REQUISITION (OUTPATIENT)
Dept: LAB | Facility: HOSPITAL | Age: 73
End: 2023-11-14
Payer: MEDICARE

## 2023-11-15 ENCOUNTER — TELEPHONE (OUTPATIENT)
Dept: GASTROENTEROLOGY | Facility: CLINIC | Age: 73
End: 2023-11-15
Payer: MEDICARE

## 2023-11-15 NOTE — TELEPHONE ENCOUNTER
"Pt called asking about her nexium, she said that \"[facility] told her her insurance would not cover it. If you could look into this that would be great. Thanks!    "

## 2023-11-16 ENCOUNTER — ANCILLARY PROCEDURE (OUTPATIENT)
Dept: RADIOLOGY | Facility: CLINIC | Age: 73
End: 2023-11-16
Payer: MEDICARE

## 2023-11-16 DIAGNOSIS — M81.0 OSTEOPOROSIS, UNSPECIFIED OSTEOPOROSIS TYPE, UNSPECIFIED PATHOLOGICAL FRACTURE PRESENCE: ICD-10-CM

## 2023-11-16 DIAGNOSIS — Z12.31 SCREENING MAMMOGRAM, ENCOUNTER FOR: ICD-10-CM

## 2023-11-16 PROCEDURE — 77080 DXA BONE DENSITY AXIAL: CPT | Performed by: RADIOLOGY

## 2023-11-16 PROCEDURE — 77063 BREAST TOMOSYNTHESIS BI: CPT

## 2023-11-16 PROCEDURE — 77063 BREAST TOMOSYNTHESIS BI: CPT | Mod: BILATERAL PROCEDURE | Performed by: RADIOLOGY

## 2023-11-16 PROCEDURE — 77067 SCR MAMMO BI INCL CAD: CPT | Mod: BILATERAL PROCEDURE | Performed by: RADIOLOGY

## 2023-11-16 PROCEDURE — 77080 DXA BONE DENSITY AXIAL: CPT

## 2023-11-27 LAB
LABORATORY COMMENT REPORT: NORMAL
PATH REPORT.FINAL DX SPEC: NORMAL
PATH REPORT.GROSS SPEC: NORMAL
PATH REPORT.RELEVANT HX SPEC: NORMAL
PATH REPORT.TOTAL CANCER: NORMAL

## 2024-02-04 NOTE — PROGRESS NOTES
Subjective     History of Present Illness:     74 yo with hypothyroidism, OA, osteoporosis, seen in follow up for suspected gerd. Last seen 3 months ago after evaluation by ENT for tongue irritation, glossitis, s/p pharyngitis with treatment with cephalexin. No classic sx of reflux. No change with H2 blocker and low dose PPI.   EGD 11/10/23- LA grade B esophagitis, otherwise negative exam. Mid esophageal biopsies neg and neg h pylori.  Colonoscopy 11/10/23- 3 TA/SSA, diverticulosis, internal hemorrhoids. Recommended no further colonoscopy due to age  She was started on nexium 40 mg daily. States she has not noticed significant change.  No longer sore throat, but this was improved before endoscopy.  Only residual symptom is some intermittent discomfort on the tip of her tongue.  She denies any heartburn, indigestion, dysphagia or other reflux symptoms.  Some mild constipation for which she takes dulcolax intermittently.      Colonoscopy 2012- diverticulosis, int hemorrhoids        Allergies  Allergies   Allergen Reactions    Ampicillin Unknown       Medications  Current Outpatient Medications   Medication Instructions    cholecalciferol (Vitamin D-3) 5,000 Units tablet 1 tablet, oral, Daily    cyanocobalamin (Vitamin B-12) 1,000 mcg tablet 1 tablet, oral, Daily    esomeprazole (NEXIUM) 40 mg, oral, Daily RT, Do not open capsule.    famotidine (PEPCID) 20 mg, oral, Nightly    levothyroxine (Synthroid, Levoxyl) 75 mcg tablet TAKE 1 TABLET BY MOUTH EVERY DAY    tiZANidine (ZANAFLEX) 2 mg, oral, Every 8 hours PRN        Objective   There were no vitals taken for this visit.   Physical Exam  Vitals reviewed.   Constitutional:       General: She is awake.   Cardiovascular:      Rate and Rhythm: Normal rate and regular rhythm.   Pulmonary:      Effort: Pulmonary effort is normal.      Breath sounds: Normal breath sounds.   Abdominal:      General: Bowel sounds are normal.      Palpations: Abdomen is soft.      Tenderness:  There is no abdominal tenderness.   Neurological:      Mental Status: She is alert and oriented to person, place, and time.   Psychiatric:         Attention and Perception: Attention and perception normal.         Behavior: Behavior normal.               Assessment/Plan:      72 yo with hypothyroidism, OA, osteoporosis, seen in follow up for GERD. EGD showed LA grade B esophagitis, started on nexium. No significant change , symptoms now only on tip of tongue. Will assess if increasing nexium bid will provide any added benefit. If not then will taper off and see how she does.   Discussed colonoscopy and finding of low risk colon cancer. If colonoscopy pursued, would be 5 yrs after last exam.     Increase nexium to bid for 1-2 mo  Dulcolax as needed for mild constipation    Follow up in 2-3 mo    Lillian Roberts MD

## 2024-02-05 ENCOUNTER — OFFICE VISIT (OUTPATIENT)
Dept: GASTROENTEROLOGY | Facility: CLINIC | Age: 74
End: 2024-02-05
Payer: MEDICARE

## 2024-02-05 VITALS — HEIGHT: 67 IN | BODY MASS INDEX: 26.59 KG/M2 | OXYGEN SATURATION: 98 % | HEART RATE: 83 BPM | WEIGHT: 169.4 LBS

## 2024-02-05 DIAGNOSIS — K14.0 GLOSSITIS: Primary | ICD-10-CM

## 2024-02-05 DIAGNOSIS — K21.00 GASTROESOPHAGEAL REFLUX DISEASE WITH ESOPHAGITIS WITHOUT HEMORRHAGE: ICD-10-CM

## 2024-02-05 PROCEDURE — 1157F ADVNC CARE PLAN IN RCRD: CPT | Performed by: INTERNAL MEDICINE

## 2024-02-05 PROCEDURE — 1159F MED LIST DOCD IN RCRD: CPT | Performed by: INTERNAL MEDICINE

## 2024-02-05 PROCEDURE — 1036F TOBACCO NON-USER: CPT | Performed by: INTERNAL MEDICINE

## 2024-02-05 PROCEDURE — 99214 OFFICE O/P EST MOD 30 MIN: CPT | Performed by: INTERNAL MEDICINE

## 2024-07-09 PROCEDURE — 88305 TISSUE EXAM BY PATHOLOGIST: CPT | Performed by: DERMATOLOGY

## 2024-07-10 ENCOUNTER — LAB REQUISITION (OUTPATIENT)
Dept: DERMATOPATHOLOGY | Facility: CLINIC | Age: 74
End: 2024-07-10
Payer: MEDICARE

## 2024-07-10 DIAGNOSIS — D48.5 NEOPLASM OF UNCERTAIN BEHAVIOR OF SKIN: ICD-10-CM

## 2024-07-11 LAB
LABORATORY COMMENT REPORT: NORMAL
PATH REPORT.FINAL DX SPEC: NORMAL
PATH REPORT.GROSS SPEC: NORMAL
PATH REPORT.MICROSCOPIC SPEC OTHER STN: NORMAL
PATH REPORT.RELEVANT HX SPEC: NORMAL
PATH REPORT.TOTAL CANCER: NORMAL

## 2024-08-07 DIAGNOSIS — E03.9 HYPOTHYROIDISM, UNSPECIFIED: ICD-10-CM

## 2024-08-08 RX ORDER — LEVOTHYROXINE SODIUM 75 UG/1
TABLET ORAL
Qty: 90 TABLET | Refills: 0 | Status: SHIPPED | OUTPATIENT
Start: 2024-08-08

## 2024-08-21 DIAGNOSIS — N30.00 ACUTE CYSTITIS WITHOUT HEMATURIA: Primary | ICD-10-CM

## 2024-08-21 RX ORDER — NITROFURANTOIN 25; 75 MG/1; MG/1
100 CAPSULE ORAL 2 TIMES DAILY
Qty: 10 CAPSULE | Refills: 0 | Status: SHIPPED | OUTPATIENT
Start: 2024-08-21 | End: 2024-08-26

## 2024-08-26 ENCOUNTER — TELEPHONE (OUTPATIENT)
Dept: PRIMARY CARE | Facility: CLINIC | Age: 74
End: 2024-08-26
Payer: MEDICARE

## 2024-08-26 NOTE — TELEPHONE ENCOUNTER
I will be seeing you in about a month but was wondering if you would place orders for me to have my urine checked for a uti.  A little while back I did a home test that what positive for white cells and it was the weekend so dr archuleta called me in something.  I am not sure it has cleared and I will be going out of town in about 10 days.

## 2024-08-27 ENCOUNTER — LAB (OUTPATIENT)
Dept: LAB | Facility: LAB | Age: 74
End: 2024-08-27
Payer: MEDICARE

## 2024-08-27 DIAGNOSIS — R30.0 DYSURIA: ICD-10-CM

## 2024-08-27 DIAGNOSIS — R30.0 DYSURIA: Primary | ICD-10-CM

## 2024-08-27 PROCEDURE — 87186 SC STD MICRODIL/AGAR DIL: CPT

## 2024-08-27 PROCEDURE — 87086 URINE CULTURE/COLONY COUNT: CPT

## 2024-08-30 DIAGNOSIS — N30.00 ACUTE CYSTITIS WITHOUT HEMATURIA: Primary | ICD-10-CM

## 2024-08-30 LAB — BACTERIA UR CULT: ABNORMAL

## 2024-08-30 RX ORDER — CIPROFLOXACIN 500 MG/1
500 TABLET ORAL 2 TIMES DAILY
Qty: 14 TABLET | Refills: 0 | Status: SHIPPED | OUTPATIENT
Start: 2024-08-30 | End: 2024-09-06

## 2024-09-30 ENCOUNTER — APPOINTMENT (OUTPATIENT)
Dept: PRIMARY CARE | Facility: CLINIC | Age: 74
End: 2024-09-30
Payer: MEDICARE

## 2024-09-30 VITALS
DIASTOLIC BLOOD PRESSURE: 72 MMHG | SYSTOLIC BLOOD PRESSURE: 128 MMHG | BODY MASS INDEX: 27.82 KG/M2 | HEART RATE: 68 BPM | WEIGHT: 167 LBS | OXYGEN SATURATION: 98 % | HEIGHT: 65 IN

## 2024-09-30 DIAGNOSIS — Z00.00 ROUTINE GENERAL MEDICAL EXAMINATION AT HEALTH CARE FACILITY: Primary | ICD-10-CM

## 2024-09-30 DIAGNOSIS — Z12.31 SCREENING MAMMOGRAM, ENCOUNTER FOR: ICD-10-CM

## 2024-09-30 DIAGNOSIS — E21.0 PRIMARY HYPERPARATHYROIDISM (MULTI): ICD-10-CM

## 2024-09-30 DIAGNOSIS — Z01.419 ENCOUNTER FOR ANNUAL ROUTINE GYNECOLOGICAL EXAMINATION: ICD-10-CM

## 2024-09-30 DIAGNOSIS — R35.89 POLYURIA: ICD-10-CM

## 2024-09-30 DIAGNOSIS — M81.0 AGE-RELATED OSTEOPOROSIS WITHOUT CURRENT PATHOLOGICAL FRACTURE: ICD-10-CM

## 2024-09-30 DIAGNOSIS — E03.9 PRIMARY HYPOTHYROIDISM: ICD-10-CM

## 2024-09-30 LAB
25(OH)D3 SERPL-MCNC: 57 NG/ML (ref 30–100)
ALBUMIN SERPL BCP-MCNC: 4.3 G/DL (ref 3.4–5)
ALP SERPL-CCNC: 103 U/L (ref 33–136)
ALT SERPL W P-5'-P-CCNC: 13 U/L (ref 7–45)
ANION GAP SERPL CALC-SCNC: 12 MMOL/L (ref 10–20)
AST SERPL W P-5'-P-CCNC: 22 U/L (ref 9–39)
BASOPHILS # BLD AUTO: 0.01 X10*3/UL (ref 0–0.1)
BASOPHILS NFR BLD AUTO: 0.2 %
BILIRUB SERPL-MCNC: 0.5 MG/DL (ref 0–1.2)
BUN SERPL-MCNC: 13 MG/DL (ref 6–23)
CALCIUM SERPL-MCNC: 10.7 MG/DL (ref 8.6–10.3)
CHLORIDE SERPL-SCNC: 101 MMOL/L (ref 98–107)
CHOLEST SERPL-MCNC: 225 MG/DL (ref 0–199)
CHOLESTEROL/HDL RATIO: 3.8
CO2 SERPL-SCNC: 29 MMOL/L (ref 21–32)
CREAT SERPL-MCNC: 0.87 MG/DL (ref 0.5–1.05)
EGFRCR SERPLBLD CKD-EPI 2021: 70 ML/MIN/1.73M*2
EOSINOPHIL # BLD AUTO: 0.11 X10*3/UL (ref 0–0.4)
EOSINOPHIL NFR BLD AUTO: 2.2 %
ERYTHROCYTE [DISTWIDTH] IN BLOOD BY AUTOMATED COUNT: 13.7 % (ref 11.5–14.5)
GLUCOSE SERPL-MCNC: 91 MG/DL (ref 74–99)
HCT VFR BLD AUTO: 43.2 % (ref 36–46)
HDLC SERPL-MCNC: 59.2 MG/DL
HGB BLD-MCNC: 13.1 G/DL (ref 12–16)
IMM GRANULOCYTES # BLD AUTO: 0.02 X10*3/UL (ref 0–0.5)
IMM GRANULOCYTES NFR BLD AUTO: 0.4 % (ref 0–0.9)
LDLC SERPL CALC-MCNC: 137 MG/DL
LYMPHOCYTES # BLD AUTO: 1.34 X10*3/UL (ref 0.8–3)
LYMPHOCYTES NFR BLD AUTO: 26.3 %
MCH RBC QN AUTO: 25.4 PG (ref 26–34)
MCHC RBC AUTO-ENTMCNC: 30.3 G/DL (ref 32–36)
MCV RBC AUTO: 84 FL (ref 80–100)
MONOCYTES # BLD AUTO: 0.46 X10*3/UL (ref 0.05–0.8)
MONOCYTES NFR BLD AUTO: 9 %
NEUTROPHILS # BLD AUTO: 3.16 X10*3/UL (ref 1.6–5.5)
NEUTROPHILS NFR BLD AUTO: 61.9 %
NON HDL CHOLESTEROL: 166 MG/DL (ref 0–149)
NRBC BLD-RTO: 0 /100 WBCS (ref 0–0)
PLATELET # BLD AUTO: 331 X10*3/UL (ref 150–450)
POC APPEARANCE, URINE: CLEAR
POC BILIRUBIN, URINE: NEGATIVE
POC BLOOD, URINE: ABNORMAL
POC COLOR, URINE: YELLOW
POC GLUCOSE, URINE: NEGATIVE MG/DL
POC KETONES, URINE: NEGATIVE MG/DL
POC LEUKOCYTES, URINE: ABNORMAL
POC NITRITE,URINE: NEGATIVE
POC PH, URINE: 7 PH
POC PROTEIN, URINE: NEGATIVE MG/DL
POC SPECIFIC GRAVITY, URINE: 1.01
POC UROBILINOGEN, URINE: 0.2 EU/DL
POTASSIUM SERPL-SCNC: 4.9 MMOL/L (ref 3.5–5.3)
PROT SERPL-MCNC: 7.4 G/DL (ref 6.4–8.2)
RBC # BLD AUTO: 5.16 X10*6/UL (ref 4–5.2)
SODIUM SERPL-SCNC: 137 MMOL/L (ref 136–145)
TRIGL SERPL-MCNC: 144 MG/DL (ref 0–149)
TSH SERPL-ACNC: 0.81 MIU/L (ref 0.44–3.98)
VLDL: 29 MG/DL (ref 0–40)
WBC # BLD AUTO: 5.1 X10*3/UL (ref 4.4–11.3)

## 2024-09-30 PROCEDURE — 82306 VITAMIN D 25 HYDROXY: CPT

## 2024-09-30 PROCEDURE — 1160F RVW MEDS BY RX/DR IN RCRD: CPT | Performed by: FAMILY MEDICINE

## 2024-09-30 PROCEDURE — 80061 LIPID PANEL: CPT

## 2024-09-30 PROCEDURE — 84443 ASSAY THYROID STIM HORMONE: CPT

## 2024-09-30 PROCEDURE — 81003 URINALYSIS AUTO W/O SCOPE: CPT | Performed by: FAMILY MEDICINE

## 2024-09-30 PROCEDURE — 80053 COMPREHEN METABOLIC PANEL: CPT

## 2024-09-30 PROCEDURE — G0101 CA SCREEN;PELVIC/BREAST EXAM: HCPCS | Performed by: FAMILY MEDICINE

## 2024-09-30 PROCEDURE — 87077 CULTURE AEROBIC IDENTIFY: CPT

## 2024-09-30 PROCEDURE — 1170F FXNL STATUS ASSESSED: CPT | Performed by: FAMILY MEDICINE

## 2024-09-30 PROCEDURE — 1157F ADVNC CARE PLAN IN RCRD: CPT | Performed by: FAMILY MEDICINE

## 2024-09-30 PROCEDURE — 87186 SC STD MICRODIL/AGAR DIL: CPT

## 2024-09-30 PROCEDURE — 87086 URINE CULTURE/COLONY COUNT: CPT

## 2024-09-30 PROCEDURE — G0439 PPPS, SUBSEQ VISIT: HCPCS | Performed by: FAMILY MEDICINE

## 2024-09-30 PROCEDURE — 1036F TOBACCO NON-USER: CPT | Performed by: FAMILY MEDICINE

## 2024-09-30 PROCEDURE — 85025 COMPLETE CBC W/AUTO DIFF WBC: CPT

## 2024-09-30 PROCEDURE — 1159F MED LIST DOCD IN RCRD: CPT | Performed by: FAMILY MEDICINE

## 2024-09-30 ASSESSMENT — ACTIVITIES OF DAILY LIVING (ADL)
BATHING: INDEPENDENT
MANAGING_FINANCES: INDEPENDENT
DOING_HOUSEWORK: INDEPENDENT
GROCERY_SHOPPING: INDEPENDENT
DRESSING: INDEPENDENT
TAKING_MEDICATION: INDEPENDENT

## 2024-09-30 NOTE — PATIENT INSTRUCTIONS
"TAKING THYROID MEDICATION     It's very important that you take your thyroid medication first thing in the morning on an empty stomach.   You should only take it with water.  You should wait at least an hour before eating or drinking anything else.   Do not eat or drink (or take pills) that have iron or calcium in them for at least 3 hours after taking your thyroid medication.  Iron and calcium significantly effect the absorption of the medicine.     Try very hard to remember to take your pill every day.    Even missing one dose can effect your thyroid levels.   If you miss doses, its important to tell your provider when you are having your blood drawn.     If we adjust your dose of your medication, its very important to be sure you have a recheck of your thyroid level about 6 weeks after that adjustment.    This might need to be an office visit, or just a lab appointment - discuss that with your provider to see which they prefer.      Thyroid medication is most often a lifelong medication.  Never stop taking your thyroid medication unless your provider tells you to do so.      If I have sent \"Synthroid\" into Synthroid's mail away company  -   Call [phone] to set up payment plan.          ABOUT MEDICARE PREVENTATIVE APPOINTMENTS:     YOUR YEARLY MEDICARE WELLNESS VISIT IS VERY IMPORTANT.      Medicare wants all of their patients to schedule their \"Welcome to Medicare\" visit  when you are in the first 12 months of being on Medicare.    Then every year after that, they want you to schedule your  \"Annual Wellness\"  visit.     These visits have a very specific list of topics I have to cover at the visit,  so you will have paperwork you need to complete before I see you.   Of interest,  there is NOT actually a physical exam as part of this visit.       If you are female,   a Well Woman Exam  (Breast exam and pelvic exam) - are paid for by Medicare every 2 years.   Mammograms are paid for every year.    If you " "are due for this exam too,  the Medicare wellness and the [facility] woman exam can be done on the same day,  PLEASE TELL THIS TO  WHEN MAKING THE APPOINTMENT.      Some of the Medicare plans ALSO cover a traditional \"physical\" - which has more of the physical exam component.   You may want to find out if your insurance covers that too.       (this is  the code - 70701)  - That can be added to the visit as well.    You would need to tell us.     IT'S VERY HELPFUL IF YOU KNOW WHAT YOUR PLAN COVERS AHEAD OF THE VISIT.      Please check to see what your plan(s) cover.   (Even checking on testing and vaccines that are covered or not helps us greatly!)     At these appointments ,  IF  we cover any of your chronic medical conditions,  medical concerns,  or medications,  I will also be billing giorgio  \"E/M  code\" which stands for \"Evaluation and Management\"    -  which is a regular office visit code.    THAT CHARGE MAY BE SUBJECT TO CO-PAYS AND DEDUCTIBLES.     PLEASE DO NOT \"SAVE\" ALL OF YOUR CONCERNS TO GO OVER AT THESE YEARLY WELLNESS VISITS.   YOU SHOULD BE SCHEDULING SEPARATE APPOINTMENTS WHEN YOU HAVE HEALTH CONCERNS TO DISCUSS AND FOR YOUR MEDICATION CHECK UP APPOINTMENTS.        Thank you.              WELL VISIT/PREVENTATIVE VISIT INSTRUCTIONS:        Call [phone] to schedule any testing ordered at [facility].      For a mammogram, call   [phone] .    Please work on healthy nutrition,  optimal sleep, and regular exercise to feel better.    If you smoke - please quit, and if you drink alcohol, please limit your intake.       Be sure to ask me about any vaccines you may be due for,  and ask me any questions you may have about vaccines.   Generally -  a flu shot is recommended every fall for everyone over 6 months of age,  a pneumonia shot is recommended for anyone 65 and over or who has chronic conditions such as diabetes, heart or lung disease,  the shingles vaccines are recommended for people " "age 50 and over,   a Tetnas/Pertussis booster is very 10 years (after the childhood set);  the RSV vaccine is for people age 65 and over,  and the COVID vaccines are recommended for everyone, but the boosters are often particular people, so ask me if you qualify.      There may be more vaccines you qualify for depending on your medical conditions, so be sure to ask me about that if you have any chronic illnesses.    Some people have insurance that will pay for the vaccines at the pharmacy, and some your doctors office - so be sure to check with your insurance about the best place to get your vaccines and your coverage of them.     Generally speaking,  good nutrition consists of lean meats, like chicken, fish and turkey (not fried);    plenty of fruits and vegetables (the fresher the better);   Less sugars, saturated fats, and processed foods - especially those made with white flour.   Try to eat the majority of your grain foods  as whole grains.   Keep an eye on your total calories in a day and serving sizes on packaging - this will help you limit your overall intake.    Remember, to lose weight (if you need to), your total calorie intake has to be about 300 - 500 calories less than what you burn in a day.   That number depends on your age, gender and body size.   Some people find a fitbit (calorie tracking device) helpful.      Please be sure to see the dentist every 6 months.    Please see the eye doctor no longer than every 2 years.    When you have your Living Will and Medical Power of  papers completed   (they have to be witnessed by 2 non-relatives or notarized to be legally binding),     please keep your originals in a safe place in your home, but make sure all your physicians have copies and that you take copies with you when you go to the hospital.        GETTING TEST RESULTS:    YOU WILL ALWAYS FIND OUT ABOUT ALL YOUR TEST RESULTS FROM ME.  I do not use the \"no news is good news\" policy.   The only " "time you would not, is if I have told you to get the testing done, and make a follow up appointment to go over it.    Then I will be waiting to talk to you at the visit about your test results.     I have a few different ways I get test results to you  (if its something urgent, we call you)  :       If you have a [employer] card -  I will have your test results on your secureach card within a week.  You should get a phone call telling you when the results are on the card, or just call the card in a week.   If two weeks go  by and you have not heard anything, call the card to see if the results are there,  and if not,  leave me a message.  If you are having trouble using [employer], they have a support line you should call :   [phone];   If you have lost your card, I need to know.     IT'S VERY IMPORTANT THAT YOU CALL TO LISTEN TO YOUR RESULTS, AS IT THEN LETS ME KNOW YOU GOT YOUR RESULTS.        If you get your test results on MY CHART,  you may commonly see your results even before I do.      I will always annotate a message on your results so you know that I saw them and how I feel about them.     If you need some help with MY CHART call the support number at [phone].    IF I mail your results to you,   I need to hear from you if you do not receive them within 2 weeks.      Be sure to let me know your preference for getting results.         BILLING FOR PREVENTATIVE VISITS.     Most insurance companies pay for a yearly \"Wellness Check\"  or they may call it a \"Preventative Physical\"   - but it's important to know what your plan covers ahead of the visit.    It's also a good idea to find out what sort of preventative testing they will cover for screening tests - like cholesterol, blood sugar, or colonoscopies. Also, find out which vaccines are covered and if you have any responsibility in paying for them.       If at your Wellness Visit,  we cover anything to do with problems/issues  you are having or " " chronic medications/ medical conditions you have,   there will ALSO be a regular office charge that day.     Blood work  or testing obtained that has anything to do with an acute issue or chronic condition is billed under that diagnosis and not screening.       It's a good idea to find out from your insurance what is covered and what is your responsibility for all of the above possible charges.           Most importantly,   if you ever have any questions or concerns that cannot wait until your next visit with me,  you can message me through MY CHART or call the office and leave a voice mail message  (please allow for at least 24 hours for responses for these messages).       Take good care.   Dr Kent            For General Healthy Nutrition    (Remember - NOT A DIET!   Diets are only good for class reunions.)    These are my general good nutrition recommendations for most people.   I use the term \" diet \"  in these instructions to mean your overall nutrition - how you eat and drink.   If we talked about something different during your visit with me,  other than what is written below,  follow that advice instead.       For most people,  eating healthier means getting less added sugar and less processed foods in your diet    The fresher the better.    Added sugar is now a part of the nutrition label on manufactured food, so you can keep an eye on it easier.    But basically,  foods and beverages  that contain regular sugar and corn syrup are the main sources of added sugars.  Eating as little of these foods as you can is best.   One shocking example of the epidemic of added sugar is soda.    One can of regular soda contains about as much added sugar as 3 regular size doughnuts!     The other issue with processed foods is the amount of processed grains they contain , such as white flour.    This is also something you want to try to limit in your diet.     But, grain products are very important for your nutrition.    " Whole grains are better for your body.     Cutting back on white breads, traditional pasta, baked goods, white rice,  and processed cereals will be healthier for you.   The better choices include whole grain breads,  whole wheat pasta,  brown rice, quinoa, barley, steel cut  or rolled oats.   If you eat cereal for breakfast, try to look for one made with whole grains and less sugar.   There are many people who have a problem with gluten, for a large variety of reasons.    Generally,  products made with wheat flour , barley or rye are the primary source of gluten.       Cutting back on saturated fats is important.    You want the majority of the meat that you eat to be chicken, fish or turkey.   Baked or broiled is best -  fried adds too much fat.    There are healthy fats that are important - fat is important for holding down appetite, vitamin absorption and several metabolic processes in the body.  Monounsaturated fats raise HDL (good cholesterol) and lower LDL (bad cholesterol).   Olive oil, peanut oil, nuts, seeds, and avocados are great sources of the good fats.       Ideas are:   Trade sour cream dip for hummus (which is rich in olive oil) or guacamole; use veggies or whole-wheat chips to dip.    Nuts are an excellent source of protein and healthy fats.   Tree nuts are the best kind, such as almonds or walnuts.   Just be careful - they are high in calories, so stick to a serving size.  (Most are about 200 calories for a 1/4 cup)      Proteins are very important for your body, and they also hold down your appetite.   Try to have protein with every meal.    These generally are meats, nuts, many beans, legumes, eggs, and dairy.   You will find protein in whole grain products and some green vegetables have a little too.     When you have dairy (if you can - many people are lactose intolerant) try to make it low fat.    Ideas are 1% milk, lowfat yogurt or cheeses, low fat cottage cheese.   I don't generally  "recommend FAT FREE because they often contain artificial products to improve taste, and the fat helps hold down your appetite.   If you are lactose intolerant, try to see if taking Lactaid before having dairy helps.      Fresh fruits and vegetables are VERY important.  The brighter the better.   Many vegetables are considered \"Free Foods\" - meaning you can eat as much as you want, and it does not matter.  These include tomatoes, cucumbers, celery, peppers, all the various lettuces and kale - to name a few.   Potatoes, corn and peas are starchy, so do have more calories, but are still healthy - you just want to watch the amount of them you eat.       Fruits are full of wonderful nutrition.   They contain natural sugar called fructose, so eating them in moderation is best.   Diabetics may need to pay careful attention to how their body reacts to the sugar.  Some fruits might drastically increase their blood sugar.      Eating smaller meals with a couple of small snacks is better for your metabolism than not eating for long amounts of time  (breakfast is very important).   Trying to avoid large meals is helpful too.    Eating like this helps keep your appetite down and keeps you in burning metabolism rather than storage metabolism so your body will use the calories you eat.       I do not tell people to stop eating sweets or snack foods - just limit the amounts you have.  The less the better.   Pay attention to serving sizes, and treat them as a treat.        Foods like doughnuts, pop tarts, sugar cereals, cookies  ARE NOT GOOD FOR BREAKFAST.   They are loaded with sugar and will cause you to be hungrier in the day and often not feel well.    Caffeine needs to be limited - no more than 2 servings a day.  Some people can't have any at all.    (if you have any sleep or anxiety issues - stop the caffeine)   Coffee, many teas, many sodas, energy drinks, almost any diet supplement,  and chocolate all contain caffeine.    "   Water is important.   For most people, 8   x  8 ounces  a day are needed.  This may vary for some health issues.    If you need to be on a low sodium diet, that means looking at labels and eating only 1000 - 2000 mg of sodium a day.    Calcium intake is important.  3 servings of a high calcium food or drink a day is recommended.   This is usually a cup of milk, a cup of yogurt, an ounce of a hard cheese or 1.5 ounces of a soft cheese are the usual servings.   There are other high calcium foods - including soy or almond milk, broccoli,  almonds, dark green leafy vegetable.   Make sure you are not getting more than 1000  - 1200 mg of total calcium a day (unless you have been told you need more by a doctor).    Vitamin D 3 is important to absorb the calcium and for your immune system.   For children, 400 IU a day is recommended.   For adults - 800 - 5000 IU a day  is recommended.  (Often the amount needed is individualized for adults - be sure to ask how much is right for you)    Physical activity is very important for good health.    Finding activities that give you regular exercise is very important for good health.  Try to find exercise you enjoy doing on a regular basis.    30 minutes at least 5 days a week of a good cardiovascular exercise is recommended.   That means something that gets your heart rate going faster than your usual baseline and you can find yourself breathing harder than usual while you are exercising.  If you have not done any exercise in a long time,  make sure you ask if its safe for you to start,  and be sure to gradually work up to your goal.      If you need to lose weight,  following these recommendations will help you.   And if you are doing all of this and still not losing weight, then its likely just the amount of food you are eating.   Learn to cut back on portion sizes.  Using smaller plates may help.  Healthy weight loss is  only about a pound a week.   You have to remember that  whatever you do to lose the weight, you must be prepared to keep it up for life for the weight to stay off.     A lot of people have a lot of luck with using something like a fit bit,  or a program where you keep track of all of your calories that you eat and what you burn off in the day.

## 2024-09-30 NOTE — PROGRESS NOTES
"Subjective   Reason for Visit: Susana Roa is an 74 y.o. female here for a Medicare Wellness visit.       HPI    Past Medical, Surgical, and Family History reviewed and updated in chart.     Reviewed all medications by prescribing practitioner or clinical pharmacist (such as prescriptions, OTCs, herbal therapies and supplements) and documented in the medical record.       LOV  [facility] wellness 2024   Last Medicare wellness with [employer]  2022     Updates and Concerns:     Today - medicare wellness and [employer]   Forms filled out by pt and reviewed     Since last appt - was to see GI about her sore throat (back of tongue and throat feeling swollen)      She did have scopes - EGD and Colonoscopy  11/10/23        EGD Bx - \"reactive gastropathy\"        Colonoscopy - tubular adenomas and sessile serrated adenomas  - repeat 3 years     Pt's throat is better -   Mostly just from avoiding spicey foods and sitting up to sleep.        UTI sx over the summer -   Grew out Pseudomonas -   Treated with Cipro    Urine is pretty clear   No Dysuria -  Going often       Recent URI  - improving       WAC -   Last WWE   2022       And today  24    Last pap 6/3/13 WNL - HPV Neg (She is done with pap smears)     LMP - stopped in her 50's - no bleeding since      - 2  Para - 2     Last mammogram - hx of BRCA ; WAS on anastrozole for 5 years, discharged from oncology 2017   was getting yearly MRIs of the breast. 2018 needed mamm too - WNL -   Could just get yearly mammograms per radiology   Last mamm  - WNL  -  2023     Breast issues? - this AM saw a red frandy      Bone density  DEXA - 10/2019 - Osteoporosis - 10/17/2019 - started Ibandronate   (Hx of anastrozole 5 years)   Saw Dr Cisse from endocrine 2021 for osteoporosis; primary hyperparathyroidism; hypothyroid and Vit D def   ordered DEXA scan , continued her on ibandronate        DEXA done 10 /2021 - improved BMD       Last one  2023 -  osteopenia - improved BMD    " "  fracture history - none     Last colonoscopy - see above     Occas constipation - mirilax helps     vaccines -   Flu shot - UTD  Pneumovax - 2014, Prevnar 13 - 9/2017 ; PREVNAR 20  - Due 2025   Adacel 2015  Shingrix :   X 2  DONE   COVID : had primaries and boosters - did get recent one  RSV - 10/15/23       vision  -  about a year ago      dentist - GOES REGULARLY    Exercise - less active  - works at times    Nutrition - seems fine -        Eats little meat     Mood - she feels it fine      Need coronary calcium score? - CARDIAC CT - JULY 2017 ZERO       passed away 2019     Living will and medical POA on chart ;   received and in chart       Chronic conditions -      Hypothyroidism -   Levothryroxine 75 mcg a day     Hyperparathrydoidism -  on Vit D ,  calcium runs mildly high  - not taking regularly     Cervical and Lumbar  DDD -   has not needed the tizanadine - worked when she needed it        Patient Care Team:  Gabriella Lacy MD as PCP - General  Gabriella Lacy MD [facility] PCP [facility]     Review of Systems    Objective   Vitals:  /72 (BP Location: Right arm, Patient Position: Sitting, BP Cuff Size: Large adult)   Pulse 68   Ht 1.657 m (5' 5.25\")   Wt 75.8 kg (167 lb)   SpO2 98%   BMI 27.58 kg/m²       Physical Exam  Vitals reviewed.   Constitutional:       General: She is not in acute distress.     Appearance: Normal appearance.   HENT:      Head: Normocephalic and atraumatic.      Nose: Nose normal.      Mouth/Throat:      Pharynx: No posterior oropharyngeal erythema.   Eyes:      Extraocular Movements: Extraocular movements intact.      Conjunctiva/sclera: Conjunctivae normal.      Pupils: Pupils are equal, round, and reactive to light.   Cardiovascular:      Rate and Rhythm: Normal rate and regular rhythm.      Heart sounds: Normal heart sounds. No murmur heard.  Pulmonary:      Effort: Pulmonary effort is normal. No respiratory distress.      " Breath sounds: Normal breath sounds. No wheezing.   Chest:      Chest wall: No mass or tenderness.   Breasts:     Julio Score is 5.      Right: Normal. No mass, nipple discharge, skin change or tenderness.      Left: Normal. No mass, nipple discharge, skin change or tenderness.   Abdominal:      Hernia: There is no hernia in the left inguinal area or right inguinal area.   Genitourinary:     Exam position: Lithotomy position.      Labia:         Right: No rash or tenderness.         Left: No rash or tenderness.       Urethra: No prolapse.      Cervix: Normal.      Uterus: Not enlarged and not tender.       Adnexa: Right adnexa normal and left adnexa normal.      Comments: Atrophy   Musculoskeletal:      Cervical back: Neck supple.   Lymphadenopathy:      Cervical: No cervical adenopathy.      Upper Body:      Right upper body: No axillary adenopathy.      Left upper body: No axillary adenopathy.   Skin:     General: Skin is warm and dry.      Findings: No rash.   Neurological:      General: No focal deficit present.      Mental Status: She is alert.   Psychiatric:         Mood and Affect: Mood normal.         Thought Content: Thought content normal.         Assessment/Plan   Problem List Items Addressed This Visit          Medium    Osteoporosis    Relevant Orders    Comprehensive Metabolic Panel    CBC and Auto Differential    Lipid Panel    Thyroid Stimulating Hormone    Primary hyperparathyroidism (Multi)    Relevant Orders    Comprehensive Metabolic Panel    CBC and Auto Differential    Vitamin D 25-Hydroxy,Total (for eval of Vitamin D levels)    Lipid Panel    Thyroid Stimulating Hormone    Primary hypothyroidism    Relevant Orders    Comprehensive Metabolic Panel    CBC and Auto Differential    Vitamin D 25-Hydroxy,Total (for eval of Vitamin D levels)    Lipid Panel    Thyroid Stimulating Hormone     Other Visit Diagnoses       Routine general medical examination at health care facility    -  Primary     Encounter for annual routine gynecological examination        Polyuria        Relevant Orders    POCT UA Automated manually resulted    Screening mammogram, encounter for        Relevant Orders    BI mammo bilateral screening tomosynthesis            Medicare Wellness today with [employer]     And follow up on meds and issues -     Checking labs today     Generally doing well     We discussed at visit any disease processes that were of concern as well as the risks, benefits and instructions of any new medication provided.    See orders and discussion section for information handed to patient on their Clinical Summary.   Patient (and/or caretaker of patient if present)  stated all questions were answered, and they voiced understanding of instructions.

## 2024-10-01 ENCOUNTER — PATIENT MESSAGE (OUTPATIENT)
Dept: PRIMARY CARE | Facility: CLINIC | Age: 74
End: 2024-10-01
Payer: MEDICARE

## 2024-10-01 DIAGNOSIS — N30.00 ACUTE CYSTITIS WITHOUT HEMATURIA: Primary | ICD-10-CM

## 2024-10-03 DIAGNOSIS — N30.00 ACUTE CYSTITIS WITHOUT HEMATURIA: Primary | ICD-10-CM

## 2024-10-03 LAB — BACTERIA UR CULT: ABNORMAL

## 2024-10-03 RX ORDER — CIPROFLOXACIN 500 MG/1
500 TABLET ORAL 2 TIMES DAILY
Qty: 14 TABLET | Refills: 0 | Status: SHIPPED | OUTPATIENT
Start: 2024-10-03 | End: 2024-10-10

## 2024-10-15 ENCOUNTER — LAB (OUTPATIENT)
Dept: LAB | Facility: LAB | Age: 74
End: 2024-10-15
Payer: MEDICARE

## 2024-10-15 DIAGNOSIS — N30.00 ACUTE CYSTITIS WITHOUT HEMATURIA: ICD-10-CM

## 2024-10-15 LAB
APPEARANCE UR: CLEAR
BACTERIA #/AREA URNS AUTO: ABNORMAL /HPF
BILIRUB UR STRIP.AUTO-MCNC: NEGATIVE MG/DL
COLOR UR: ABNORMAL
GLUCOSE UR STRIP.AUTO-MCNC: NORMAL MG/DL
KETONES UR STRIP.AUTO-MCNC: NEGATIVE MG/DL
LEUKOCYTE ESTERASE UR QL STRIP.AUTO: ABNORMAL
NITRITE UR QL STRIP.AUTO: NEGATIVE
PH UR STRIP.AUTO: 6.5 [PH]
PROT UR STRIP.AUTO-MCNC: NEGATIVE MG/DL
RBC # UR STRIP.AUTO: NEGATIVE /UL
RBC #/AREA URNS AUTO: ABNORMAL /HPF
SP GR UR STRIP.AUTO: 1.01
UROBILINOGEN UR STRIP.AUTO-MCNC: NORMAL MG/DL
WBC #/AREA URNS AUTO: ABNORMAL /HPF

## 2024-10-15 PROCEDURE — 87086 URINE CULTURE/COLONY COUNT: CPT

## 2024-10-15 PROCEDURE — 81001 URINALYSIS AUTO W/SCOPE: CPT

## 2024-10-16 LAB — BACTERIA UR CULT: NORMAL

## 2024-10-22 DIAGNOSIS — B37.31 VULVAR CANDIDIASIS: Primary | ICD-10-CM

## 2024-10-22 RX ORDER — TERCONAZOLE 80 MG/1
80 SUPPOSITORY VAGINAL NIGHTLY
Qty: 3 SUPPOSITORY | Refills: 0 | Status: SHIPPED | OUTPATIENT
Start: 2024-10-22 | End: 2024-10-25

## 2024-11-02 DIAGNOSIS — E03.9 HYPOTHYROIDISM, UNSPECIFIED: ICD-10-CM

## 2024-11-03 RX ORDER — LEVOTHYROXINE SODIUM 75 UG/1
TABLET ORAL
Qty: 90 TABLET | Refills: 3 | Status: SHIPPED | OUTPATIENT
Start: 2024-11-03

## 2024-11-18 ENCOUNTER — HOSPITAL ENCOUNTER (OUTPATIENT)
Dept: RADIOLOGY | Facility: CLINIC | Age: 74
Discharge: HOME | End: 2024-11-18
Payer: MEDICARE

## 2024-11-18 VITALS — HEIGHT: 66 IN | BODY MASS INDEX: 26.2 KG/M2 | WEIGHT: 163 LBS

## 2024-11-18 DIAGNOSIS — Z12.31 SCREENING MAMMOGRAM, ENCOUNTER FOR: ICD-10-CM

## 2024-11-18 PROCEDURE — 77067 SCR MAMMO BI INCL CAD: CPT

## 2024-11-18 PROCEDURE — 77063 BREAST TOMOSYNTHESIS BI: CPT | Performed by: RADIOLOGY

## 2024-11-18 PROCEDURE — 77067 SCR MAMMO BI INCL CAD: CPT | Performed by: RADIOLOGY

## 2025-01-16 PROCEDURE — 87070 CULTURE OTHR SPECIMN AEROBIC: CPT

## 2025-01-16 PROCEDURE — 87077 CULTURE AEROBIC IDENTIFY: CPT

## 2025-01-16 PROCEDURE — 87205 SMEAR GRAM STAIN: CPT

## 2025-01-16 PROCEDURE — 87186 SC STD MICRODIL/AGAR DIL: CPT

## 2025-01-16 PROCEDURE — 87075 CULTR BACTERIA EXCEPT BLOOD: CPT

## 2025-01-20 ENCOUNTER — LAB REQUISITION (OUTPATIENT)
Dept: LAB | Facility: HOSPITAL | Age: 75
End: 2025-01-20
Payer: MEDICARE

## 2025-01-20 DIAGNOSIS — L98.9 DISORDER OF THE SKIN AND SUBCUTANEOUS TISSUE, UNSPECIFIED: ICD-10-CM

## 2025-01-24 LAB
BACTERIA SPEC CULT: ABNORMAL
BACTERIA SPEC CULT: ABNORMAL
GRAM STN SPEC: ABNORMAL
GRAM STN SPEC: ABNORMAL

## 2025-04-06 ENCOUNTER — PATIENT MESSAGE (OUTPATIENT)
Dept: PRIMARY CARE | Facility: CLINIC | Age: 75
End: 2025-04-06
Payer: MEDICARE

## 2025-04-06 DIAGNOSIS — R30.0 DYSURIA: Primary | ICD-10-CM

## 2025-04-09 LAB
APPEARANCE UR: CLEAR
BACTERIA #/AREA URNS HPF: ABNORMAL /HPF
BACTERIA UR CULT: NORMAL
BILIRUB UR QL STRIP: NEGATIVE
CAOX CRY #/AREA URNS HPF: ABNORMAL /HPF
COLOR UR: YELLOW
GLUCOSE UR QL STRIP: NEGATIVE
HGB UR QL STRIP: NEGATIVE
HYALINE CASTS #/AREA URNS LPF: ABNORMAL /LPF
KETONES UR QL STRIP: NEGATIVE
LEUKOCYTE ESTERASE UR QL STRIP: ABNORMAL
NITRITE UR QL STRIP: NEGATIVE
PH UR STRIP: 6 [PH] (ref 5–8)
PROT UR QL STRIP: NEGATIVE
RBC #/AREA URNS HPF: ABNORMAL /HPF
SERVICE CMNT-IMP: ABNORMAL
SP GR UR STRIP: 1.01 (ref 1–1.03)
SQUAMOUS #/AREA URNS HPF: ABNORMAL /HPF
WBC #/AREA URNS HPF: ABNORMAL /HPF

## 2025-04-10 ENCOUNTER — TELEMEDICINE (OUTPATIENT)
Dept: PRIMARY CARE | Facility: CLINIC | Age: 75
End: 2025-04-10
Payer: MEDICARE

## 2025-04-10 DIAGNOSIS — N90.5 VULVAR ATROPHY: Primary | ICD-10-CM

## 2025-04-10 PROCEDURE — 99212 OFFICE O/P EST SF 10 MIN: CPT | Performed by: FAMILY MEDICINE

## 2025-04-10 PROCEDURE — 1157F ADVNC CARE PLAN IN RCRD: CPT | Performed by: FAMILY MEDICINE

## 2025-04-10 RX ORDER — ESTRADIOL 0.1 MG/G
CREAM VAGINAL
Qty: 42.5 G | Refills: 5 | Status: SHIPPED | OUTPATIENT
Start: 2025-04-10

## 2025-04-10 NOTE — PROGRESS NOTES
Subjective   Patient ID: Susana Roa is a 74 y.o. female who presents for dysuria.     HPI     Virtual or Telephone Consent    While technically available, the patient was unable or unwilling to consent to connect via audio/video telehealth technology; therefore, I performed this visit using a real-time audio only connection between Susana Roa & Gabriella Lacy MD.  Verbal consent was requested and obtained from Susana Roa on this date, 04/10/25 for a telehealth visit and the patient's location was confirmed at the time of the visit.     Phone appt  Constant  discomfort at urethra now   Mild increase in pain with urinating       Culture 4/7/2024 - Negative   (Previous cx neg too)   U/A  - 2 plus Leuk, WBC  6 - 10 /HPF  Moderate Calcium Oxalate Crystals       Pelvic Exam 9/2024 -   Atrophic vulvar tissue     Does live about an hour away     Review of Systems    Objective   There were no vitals taken for this visit.    Physical Exam    NAD     Assessment/Plan   Assessment & Plan  Vulvar atrophy    Orders:    estradiol (Estrace) 0.01 % (0.1 mg/gram) vaginal cream; Apply  1 gram a night to vagina for 1 week then  every other night for one week  then up to twice a week after that - finding lowest effective dose      She agreed to try the vaginal estrogen cream to see if that helps.  Education on use

## 2025-04-11 NOTE — PATIENT INSTRUCTIONS
Lets try the estradiol cream  -   Apply 1 gram nightly in your vagina for one week , then every other night for a week - then just find the lowest effective dose that keeps your vaginal tissues healthy - no more than twice a week.

## 2025-04-11 NOTE — ASSESSMENT & PLAN NOTE
Orders:    estradiol (Estrace) 0.01 % (0.1 mg/gram) vaginal cream; Apply  1 gram a night to vagina for 1 week then  every other night for one week  then up to twice a week after that - finding lowest effective dose

## 2025-04-28 ENCOUNTER — PATIENT MESSAGE (OUTPATIENT)
Dept: PRIMARY CARE | Facility: CLINIC | Age: 75
End: 2025-04-28
Payer: MEDICARE

## 2025-04-28 DIAGNOSIS — R10.2 PELVIC PAIN: Primary | ICD-10-CM

## 2025-04-29 RX ORDER — PHENAZOPYRIDINE HYDROCHLORIDE 100 MG/1
100 TABLET, FILM COATED ORAL EVERY 8 HOURS PRN
Qty: 60 TABLET | Refills: 0 | Status: SHIPPED | OUTPATIENT
Start: 2025-04-29 | End: 2025-05-09

## 2025-05-19 ENCOUNTER — PATIENT MESSAGE (OUTPATIENT)
Dept: PRIMARY CARE | Facility: CLINIC | Age: 75
End: 2025-05-19
Payer: MEDICARE

## 2025-05-19 DIAGNOSIS — R30.0 DYSURIA: Primary | ICD-10-CM

## 2025-05-19 RX ORDER — PHENAZOPYRIDINE HYDROCHLORIDE 200 MG/1
200 TABLET, FILM COATED ORAL 3 TIMES DAILY PRN
Qty: 45 TABLET | Refills: 0 | Status: SHIPPED | OUTPATIENT
Start: 2025-05-19 | End: 2025-06-03

## 2025-05-23 LAB
APPEARANCE UR: CLEAR
BACTERIA #/AREA URNS HPF: ABNORMAL /HPF
BACTERIA UR CULT: ABNORMAL
BACTERIA UR CULT: ABNORMAL
BILIRUB UR QL STRIP: NEGATIVE
COLOR UR: YELLOW
GLUCOSE UR QL STRIP: NEGATIVE
HGB UR QL STRIP: NEGATIVE
HYALINE CASTS #/AREA URNS LPF: ABNORMAL /LPF
KETONES UR QL STRIP: NEGATIVE
LEUKOCYTE ESTERASE UR QL STRIP: ABNORMAL
NITRITE UR QL STRIP: NEGATIVE
PH UR STRIP: 6 [PH] (ref 5–8)
PROT UR QL STRIP: NEGATIVE
RBC #/AREA URNS HPF: ABNORMAL /HPF
SERVICE CMNT-IMP: ABNORMAL
SP GR UR STRIP: 1.01 (ref 1–1.03)
SQUAMOUS #/AREA URNS HPF: ABNORMAL /HPF
WBC #/AREA URNS HPF: ABNORMAL /HPF

## 2025-06-26 ENCOUNTER — HOSPITAL ENCOUNTER (OUTPATIENT)
Dept: RADIOLOGY | Facility: CLINIC | Age: 75
Discharge: HOME | End: 2025-06-26
Payer: MEDICARE

## 2025-06-26 DIAGNOSIS — N36.1 URETHRAL DIVERTICULUM: ICD-10-CM

## 2025-06-26 PROCEDURE — 72197 MRI PELVIS W/O & W/DYE: CPT

## 2025-06-26 PROCEDURE — 2550000001 HC RX 255 CONTRASTS: Performed by: UROLOGY

## 2025-06-26 PROCEDURE — A9575 INJ GADOTERATE MEGLUMI 0.1ML: HCPCS | Performed by: UROLOGY

## 2025-06-26 RX ORDER — GADOTERATE MEGLUMINE 376.9 MG/ML
15 INJECTION INTRAVENOUS
Status: COMPLETED | OUTPATIENT
Start: 2025-06-26 | End: 2025-06-26

## 2025-06-26 RX ADMIN — GADOTERATE MEGLUMINE 15 ML: 376.9 INJECTION INTRAVENOUS at 11:21

## 2025-09-19 ENCOUNTER — APPOINTMENT (OUTPATIENT)
Dept: PRIMARY CARE | Facility: CLINIC | Age: 75
End: 2025-09-19
Payer: MEDICARE

## 2027-01-04 ENCOUNTER — APPOINTMENT (OUTPATIENT)
Dept: PRIMARY CARE | Facility: CLINIC | Age: 77
End: 2027-01-04
Payer: MEDICARE